# Patient Record
Sex: MALE | Race: WHITE | NOT HISPANIC OR LATINO | Employment: FULL TIME
[De-identification: names, ages, dates, MRNs, and addresses within clinical notes are randomized per-mention and may not be internally consistent; named-entity substitution may affect disease eponyms.]

---
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
## 2024-08-09 ENCOUNTER — APPOINTMENT (OUTPATIENT)
Dept: CT IMAGING | Facility: HOSPITAL | Age: 19
DRG: 201 | End: 2024-08-09
Attending: EMERGENCY MEDICINE
Payer: COMMERCIAL

## 2024-08-09 ENCOUNTER — APPOINTMENT (OUTPATIENT)
Dept: RADIOLOGY | Facility: HOSPITAL | Age: 19
DRG: 201 | End: 2024-08-09
Attending: EMERGENCY MEDICINE
Payer: COMMERCIAL

## 2024-08-09 ENCOUNTER — HOSPITAL ENCOUNTER (INPATIENT)
Facility: HOSPITAL | Age: 19
LOS: 1 days | Discharge: HOME | DRG: 201 | End: 2024-08-10
Attending: EMERGENCY MEDICINE | Admitting: INTERNAL MEDICINE
Payer: COMMERCIAL

## 2024-08-09 DIAGNOSIS — J93.83 SPONTANEOUS PNEUMOTHORAX: ICD-10-CM

## 2024-08-09 PROBLEM — Z83.79 FAMILY HISTORY OF CELIAC DISEASE: Status: ACTIVE | Noted: 2018-03-27

## 2024-08-09 PROBLEM — T14.8XXA CONTUSION OF BONE: Status: ACTIVE | Noted: 2018-09-24

## 2024-08-09 PROBLEM — R22.9 SUBCUTANEOUS NODULES, GENERALIZED: Status: ACTIVE | Noted: 2018-03-27

## 2024-08-09 LAB
ALBUMIN SERPL BCG-MCNC: 4.7 G/DL (ref 3.5–5.2)
ALP SERPL-CCNC: 82 U/L (ref 40–150)
ALT SERPL W P-5'-P-CCNC: 14 U/L (ref 0–70)
ANION GAP SERPL CALCULATED.3IONS-SCNC: 12 MMOL/L (ref 7–15)
AST SERPL W P-5'-P-CCNC: 16 U/L (ref 0–45)
BASOPHILS # BLD AUTO: 0.1 10E3/UL (ref 0–0.2)
BASOPHILS NFR BLD AUTO: 1 %
BILIRUB DIRECT SERPL-MCNC: <0.2 MG/DL (ref 0–0.3)
BILIRUB SERPL-MCNC: 0.5 MG/DL
BUN SERPL-MCNC: 12.7 MG/DL (ref 6–20)
CALCIUM SERPL-MCNC: 9.2 MG/DL (ref 8.8–10.4)
CHLORIDE SERPL-SCNC: 106 MMOL/L (ref 98–107)
CREAT SERPL-MCNC: 1.18 MG/DL (ref 0.67–1.17)
EGFRCR SERPLBLD CKD-EPI 2021: 90 ML/MIN/1.73M2
EOSINOPHIL # BLD AUTO: 0.4 10E3/UL (ref 0–0.7)
EOSINOPHIL NFR BLD AUTO: 6 %
ERYTHROCYTE [DISTWIDTH] IN BLOOD BY AUTOMATED COUNT: 12.5 % (ref 10–15)
GLUCOSE SERPL-MCNC: 88 MG/DL (ref 70–99)
HCO3 SERPL-SCNC: 24 MMOL/L (ref 22–29)
HCT VFR BLD AUTO: 42 % (ref 40–53)
HGB BLD-MCNC: 14.7 G/DL (ref 13.3–17.7)
IMM GRANULOCYTES # BLD: 0 10E3/UL
IMM GRANULOCYTES NFR BLD: 0 %
LYMPHOCYTES # BLD AUTO: 2.1 10E3/UL (ref 0.8–5.3)
LYMPHOCYTES NFR BLD AUTO: 29 %
MAGNESIUM SERPL-MCNC: 2 MG/DL (ref 1.7–2.3)
MCH RBC QN AUTO: 30.3 PG (ref 26.5–33)
MCHC RBC AUTO-ENTMCNC: 35 G/DL (ref 31.5–36.5)
MCV RBC AUTO: 87 FL (ref 78–100)
MONOCYTES # BLD AUTO: 0.6 10E3/UL (ref 0–1.3)
MONOCYTES NFR BLD AUTO: 9 %
NEUTROPHILS # BLD AUTO: 3.9 10E3/UL (ref 1.6–8.3)
NEUTROPHILS NFR BLD AUTO: 56 %
NRBC # BLD AUTO: 0 10E3/UL
NRBC BLD AUTO-RTO: 0 /100
PLATELET # BLD AUTO: 209 10E3/UL (ref 150–450)
POTASSIUM SERPL-SCNC: 4 MMOL/L (ref 3.4–5.3)
PROT SERPL-MCNC: 6.9 G/DL (ref 6.4–8.3)
RBC # BLD AUTO: 4.85 10E6/UL (ref 4.4–5.9)
SODIUM SERPL-SCNC: 142 MMOL/L (ref 135–145)
TSH SERPL DL<=0.005 MIU/L-ACNC: 2.35 UIU/ML (ref 0.3–4.2)
WBC # BLD AUTO: 7 10E3/UL (ref 4–11)

## 2024-08-09 PROCEDURE — 84443 ASSAY THYROID STIM HORMONE: CPT | Performed by: EMERGENCY MEDICINE

## 2024-08-09 PROCEDURE — 99222 1ST HOSP IP/OBS MODERATE 55: CPT | Performed by: INTERNAL MEDICINE

## 2024-08-09 PROCEDURE — 85025 COMPLETE CBC W/AUTO DIFF WBC: CPT | Performed by: EMERGENCY MEDICINE

## 2024-08-09 PROCEDURE — 36415 COLL VENOUS BLD VENIPUNCTURE: CPT | Performed by: EMERGENCY MEDICINE

## 2024-08-09 PROCEDURE — 80053 COMPREHEN METABOLIC PANEL: CPT | Performed by: EMERGENCY MEDICINE

## 2024-08-09 PROCEDURE — 99291 CRITICAL CARE FIRST HOUR: CPT | Mod: 25

## 2024-08-09 PROCEDURE — 71045 X-RAY EXAM CHEST 1 VIEW: CPT

## 2024-08-09 PROCEDURE — 83735 ASSAY OF MAGNESIUM: CPT | Performed by: EMERGENCY MEDICINE

## 2024-08-09 PROCEDURE — 71250 CT THORAX DX C-: CPT

## 2024-08-09 PROCEDURE — 120N000001 HC R&B MED SURG/OB

## 2024-08-09 PROCEDURE — 93005 ELECTROCARDIOGRAM TRACING: CPT | Performed by: EMERGENCY MEDICINE

## 2024-08-09 RX ORDER — OXYCODONE HYDROCHLORIDE 5 MG/1
5 TABLET ORAL EVERY 4 HOURS PRN
Status: DISCONTINUED | OUTPATIENT
Start: 2024-08-09 | End: 2024-08-10 | Stop reason: HOSPADM

## 2024-08-09 RX ORDER — AMOXICILLIN 250 MG
1 CAPSULE ORAL 2 TIMES DAILY PRN
Status: DISCONTINUED | OUTPATIENT
Start: 2024-08-09 | End: 2024-08-10 | Stop reason: HOSPADM

## 2024-08-09 RX ORDER — ACETAMINOPHEN 325 MG/1
650 TABLET ORAL EVERY 4 HOURS PRN
Status: DISCONTINUED | OUTPATIENT
Start: 2024-08-09 | End: 2024-08-10 | Stop reason: HOSPADM

## 2024-08-09 RX ORDER — ACETAMINOPHEN 650 MG/1
650 SUPPOSITORY RECTAL EVERY 4 HOURS PRN
Status: DISCONTINUED | OUTPATIENT
Start: 2024-08-09 | End: 2024-08-10 | Stop reason: HOSPADM

## 2024-08-09 RX ORDER — LIDOCAINE 40 MG/G
CREAM TOPICAL
Status: DISCONTINUED | OUTPATIENT
Start: 2024-08-09 | End: 2024-08-10 | Stop reason: HOSPADM

## 2024-08-09 RX ORDER — CALCIUM CARBONATE 500 MG/1
1000 TABLET, CHEWABLE ORAL 4 TIMES DAILY PRN
Status: DISCONTINUED | OUTPATIENT
Start: 2024-08-09 | End: 2024-08-10 | Stop reason: HOSPADM

## 2024-08-09 RX ORDER — AMOXICILLIN 250 MG
2 CAPSULE ORAL 2 TIMES DAILY PRN
Status: DISCONTINUED | OUTPATIENT
Start: 2024-08-09 | End: 2024-08-10 | Stop reason: HOSPADM

## 2024-08-09 ASSESSMENT — ACTIVITIES OF DAILY LIVING (ADL)
ADLS_ACUITY_SCORE: 35

## 2024-08-09 ASSESSMENT — COLUMBIA-SUICIDE SEVERITY RATING SCALE - C-SSRS
1. IN THE PAST MONTH, HAVE YOU WISHED YOU WERE DEAD OR WISHED YOU COULD GO TO SLEEP AND NOT WAKE UP?: NO
2. HAVE YOU ACTUALLY HAD ANY THOUGHTS OF KILLING YOURSELF IN THE PAST MONTH?: NO
6. HAVE YOU EVER DONE ANYTHING, STARTED TO DO ANYTHING, OR PREPARED TO DO ANYTHING TO END YOUR LIFE?: NO

## 2024-08-09 NOTE — LETTER
[address] [address]  [phone]      August 10, 2024    Jason Vaughn  [address]  [place] [address]  [phone] (home)     : 2005      To Whom it may concern:    Jason Vaughn was admitted to the hospital 2024.    I recommend he be off of work for 1 week.    I recommend that he avoid lifting weights above 10 to 20 pounds for the next 4 weeks.    He has been compliant with his treatment and my ecommendations and I will be seeing him in follow-up after hospital discharge.        Sincerely,    Brandon Head MD

## 2024-08-10 ENCOUNTER — APPOINTMENT (OUTPATIENT)
Dept: RADIOLOGY | Facility: HOSPITAL | Age: 19
DRG: 201 | End: 2024-08-10
Attending: INTERNAL MEDICINE
Payer: COMMERCIAL

## 2024-08-10 VITALS
WEIGHT: 140 LBS | DIASTOLIC BLOOD PRESSURE: 70 MMHG | TEMPERATURE: 98 F | RESPIRATION RATE: 18 BRPM | BODY MASS INDEX: 23.32 KG/M2 | HEART RATE: 56 BPM | SYSTOLIC BLOOD PRESSURE: 132 MMHG | HEIGHT: 65 IN | OXYGEN SATURATION: 100 %

## 2024-08-10 DIAGNOSIS — J93.11 PRIMARY SPONTANEOUS PNEUMOTHORAX: Primary | ICD-10-CM

## 2024-08-10 LAB
ANION GAP SERPL CALCULATED.3IONS-SCNC: 11 MMOL/L (ref 7–15)
BUN SERPL-MCNC: 14.6 MG/DL (ref 6–20)
CALCIUM SERPL-MCNC: 8.8 MG/DL (ref 8.8–10.4)
CHLORIDE SERPL-SCNC: 110 MMOL/L (ref 98–107)
CREAT SERPL-MCNC: 1.38 MG/DL (ref 0.67–1.17)
EGFRCR SERPLBLD CKD-EPI 2021: 75 ML/MIN/1.73M2
GLUCOSE SERPL-MCNC: 88 MG/DL (ref 70–99)
HCO3 SERPL-SCNC: 25 MMOL/L (ref 22–29)
POTASSIUM SERPL-SCNC: 4.4 MMOL/L (ref 3.4–5.3)
SODIUM SERPL-SCNC: 146 MMOL/L (ref 135–145)

## 2024-08-10 PROCEDURE — 36415 COLL VENOUS BLD VENIPUNCTURE: CPT | Performed by: INTERNAL MEDICINE

## 2024-08-10 PROCEDURE — 99238 HOSP IP/OBS DSCHRG MGMT 30/<: CPT | Performed by: INTERNAL MEDICINE

## 2024-08-10 PROCEDURE — 80048 BASIC METABOLIC PNL TOTAL CA: CPT | Performed by: INTERNAL MEDICINE

## 2024-08-10 PROCEDURE — 71045 X-RAY EXAM CHEST 1 VIEW: CPT

## 2024-08-10 PROCEDURE — 99223 1ST HOSP IP/OBS HIGH 75: CPT | Performed by: INTERNAL MEDICINE

## 2024-08-10 RX ORDER — NALOXONE HYDROCHLORIDE 0.4 MG/ML
0.4 INJECTION, SOLUTION INTRAMUSCULAR; INTRAVENOUS; SUBCUTANEOUS
Status: DISCONTINUED | OUTPATIENT
Start: 2024-08-10 | End: 2024-08-10 | Stop reason: HOSPADM

## 2024-08-10 RX ORDER — NALOXONE HYDROCHLORIDE 0.4 MG/ML
0.2 INJECTION, SOLUTION INTRAMUSCULAR; INTRAVENOUS; SUBCUTANEOUS
Status: DISCONTINUED | OUTPATIENT
Start: 2024-08-10 | End: 2024-08-10 | Stop reason: HOSPADM

## 2024-08-10 RX ORDER — ACETAMINOPHEN 325 MG/1
650 TABLET ORAL EVERY 4 HOURS PRN
COMMUNITY
Start: 2024-08-10

## 2024-08-10 ASSESSMENT — ACTIVITIES OF DAILY LIVING (ADL)
CONCENTRATING,_REMEMBERING_OR_MAKING_DECISIONS_DIFFICULTY: NO
DIFFICULTY_COMMUNICATING: NO
DIFFICULTY_EATING/SWALLOWING: NO
ADLS_ACUITY_SCORE: 18
DOING_ERRANDS_INDEPENDENTLY_DIFFICULTY: NO
ADLS_ACUITY_SCORE: 18
TOILETING_ISSUES: NO
ADLS_ACUITY_SCORE: 18
ADLS_ACUITY_SCORE: 18
HEARING_DIFFICULTY_OR_DEAF: NO
FALL_HISTORY_WITHIN_LAST_SIX_MONTHS: NO
WEAR_GLASSES_OR_BLIND: NO
ADLS_ACUITY_SCORE: 18
WALKING_OR_CLIMBING_STAIRS_DIFFICULTY: NO
CHANGE_IN_FUNCTIONAL_STATUS_SINCE_ONSET_OF_CURRENT_ILLNESS/INJURY: NO
DRESSING/BATHING_DIFFICULTY: NO

## 2024-08-10 NOTE — ED NOTES
Expected Patient Referral to ED  7:18 PM    Referring Clinic/Provider:  Urgent care    Reason for referral/Clinical facts:  - came for left shoulder pain x1 day, found to have left pneumothorax on X-rays (no tension per radiology read)  - reports having history of tension patient in the past  - refused EMS and driving himself    Recommendations provided:  Send to ED for further evaluation    Caller was informed that this institution does possess the capabilities and/or resources to provide for patient and should be transferred to our facility.    Discussed that if direct admit is sought and any hurdles are encountered, this ED would be happy to see the patient and evaluate.    Informed caller that recommendations provided are recommendations based only on the facts provided and that they responsible to accept or reject the advice, or to seek a formal in person consultation as needed and that this ED will see/treat patient should they arrive.      Hasmukh Gonzalez MD  [facility] EMERGENCY DEPARTMENT  [address] [place] [id number]  [phone]     Hasmukh Gonzalez MD  08/09/24 1262

## 2024-08-10 NOTE — CARE PLAN
Received Pt from ED at 10:55 alert and oriented, denies pain and  SOB. Pt VSS on 4L O2 sating 99%.  Pt was  settle in bed and report given to the incoming nurse.

## 2024-08-10 NOTE — ED TRIAGE NOTES
Pt arrives to triage due to a referral from their primary clinic. Pt was found to have a pneumothorax. He initially was seen due to chest and back pain. Pt reports when he takes a deep breath the pain is rated 9/10.

## 2024-08-10 NOTE — CONSULTS
LUNG NODULE & INTERVENTIONAL PULMONARY CLINIC  [facility], [facility]     Jason Vaughn MRN# [id number]   Age: 19 year old YOB: 2005       Requesting Physician: No referring provider defined for this encounter.       Assessment and Plan:    1.  Primary spontaneous pneumothorax.  Recurrent.  In the setting of weightlifting, full tidal volume inhalation and resulting coughing from marijuana.  This is improved with conservative management.  I am hopeful that with modifying his smoking behavior he can return to his normal activities.    Chest x-ray next week-I will order  He has my contact information in clinic if he has further pneumothorax type symptoms.  It looks like he can go home today.    It sounds like every episode has been in the left side.  Given the very small nature and its improvement with conservative management I do not recommend surgical intervention at this time.  I will be following up in clinic.    Lifestyle recommendations  1 week off of work  Limit work-related lifting to 10 to 20 pounds for a month  Avoid fitness/weight lifting for 1 month  No smoking  No flying, scuba diving for 1 month      2.  Lung nodules.  Very small.  Incidental.  I do not recommend further follow-up.    Recommendations given directly to hospital medicine MD.    Work note signed and placed in chart.           History:     Jason Vaughn is a 19 year old male with sig h/o for power lifting, smoking who is here for evaluation/followup of spontaneous pneumothorax.  Sounds like he has had this happen before.  Starts with left shoulder blade pain that spreads to a numbness of the left arm as well as anterior left chest.  No obvious sudden causative factor.  He does low rep high weight lifting.  He smokes marijuana occasionally with large tidal inhalations and significant coughing afterwards.  He is a .      - My interpretation of the images  relevant for this visit includes: Small pneumothorax without obvious blebs.             Past Medical History:    History reviewed. No pertinent past medical history.        Past Surgical History:    History reviewed. No pertinent surgical history.       Social History:     Social History     Tobacco Use    Smoking status: Never    Smokeless tobacco: Not on file   Substance Use Topics    Alcohol use: No          Family History:     Family History   Problem Relation Age of Onset    Allergies Mother         hayfever    Family History Negative Father     Lipids Maternal Grandmother         cholesterol    Lipids Maternal Grandfather         cholesterol    Hypertension Maternal Grandfather     Family History Negative Paternal Grandmother     Family History Negative Paternal Grandfather            Allergies:      Allergies   Allergen Reactions    Gluten Meal           Medications:     Current Facility-Administered Medications   Medication Dose Route Frequency Provider Last Rate Last Admin    acetaminophen (TYLENOL) tablet 650 mg  650 mg Oral Q4H PRN Irasema Chatman MD        Or    acetaminophen (TYLENOL) Suppository 650 mg  650 mg Rectal Q4H PRN Irasema Chatman MD        calcium carbonate (TUMS) chewable tablet 1,000 mg  1,000 mg Oral 4x Daily PRN Irasema Chatman MD        lidocaine (LMX4) cream   Topical Q1H PRN Irasema Chatman MD        lidocaine 1 % 0.1-1 mL  0.1-1 mL Other Q1H PRN Irasema Chatman MD        naloxone (NARCAN) injection 0.2 mg  0.2 mg Intravenous Q2 Min PRN Sondra Mott MD        Or    naloxone (NARCAN) injection 0.4 mg  0.4 mg Intravenous Q2 Min PRN Sondra Mott MD        Or    naloxone (NARCAN) injection 0.2 mg  0.2 mg Intramuscular Q2 Min PRN Sondra Mott MD        Or    naloxone (NARCAN) injection 0.4 mg  0.4 mg Intramuscular Q2 Min PRN Sondra Mott MD        oxyCODONE (ROXICODONE) tablet 5 mg  5 mg Oral Q4H PRN Irasema Chatman MD        oxyCODONE IR  "(ROXICODONE) half-tab 2.5 mg  2.5 mg Oral Q4H PRN Irasema Chatman MD        senna-docusate (SENOKOT-S/PERICOLACE) 8.6-50 MG per tablet 1 tablet  1 tablet Oral BID PRN Irasema Chatman MD        Or    senna-docusate (SENOKOT-S/PERICOLACE) 8.6-50 MG per tablet 2 tablet  2 tablet Oral BID PRN Irasema Chatman MD        sodium chloride (PF) 0.9% PF flush 3 mL  3 mL Intracatheter Q8H Irasema Chatman MD   3 mL at 08/10/24 0616    sodium chloride (PF) 0.9% PF flush 3 mL  3 mL Intracatheter q1 min prn Irasema Chatman MD              Review of Systems:     See HPI         Physical Exam:   /70   Pulse 56   Temp 98  F (36.7  C) (Oral)   Resp 18   Ht 1.651 m (5' 5\")   Wt 63.5 kg (140 lb)   SpO2 100%   BMI 23.30 kg/m      Constitutional - looks well, in no apparent distress  Eyes - no redness or discharge  Respiratory -breathing appears comfortable. No wheeze or rhonchi.   Cardiac -- Normal rate, rhythm.   Skin - No appreciable discoloration or lesions (very limited exam)  Neurological - No apparent tremors. Speech fluent and articlate  Psychiatric - no signs of delirium or anxiety          Current Laboratory Data:   All laboratory and imaging data reviewed.    Results for orders placed or performed during the hospital encounter of 08/09/24 (from the past 24 hour(s))   XR Chest Port 1 View    Narrative    EXAM: XR CHEST PORT 1 VIEW  LOCATION: [facility]  DATE: 8/9/2024    INDICATION: chest pain, follow-up pneumothorax  COMPARISON: 8/9/2024 at 1806 hours      Impression    IMPRESSION: No change. Small left pneumothorax measures approximately 1.8 cm at the apex. Lungs remain clear. No shift of midline. Heart size normal.   CBC with platelets differential    Narrative    The following orders were created for panel order CBC with platelets differential.  Procedure                               Abnormality         Status                     ---------                              "  -----------         ------                     CBC with platelets and d...[[id number]]                      Final result                 Please view results for these tests on the individual orders.   Basic metabolic panel   Result Value Ref Range    Sodium 142 135 - 145 mmol/L    Potassium 4.0 3.4 - 5.3 mmol/L    Chloride 106 98 - 107 mmol/L    Carbon Dioxide (CO2) 24 22 - 29 mmol/L    Anion Gap 12 7 - 15 mmol/L    Urea Nitrogen 12.7 6.0 - 20.0 mg/dL    Creatinine 1.18 (H) 0.67 - 1.17 mg/dL    GFR Estimate 90 >60 mL/min/1.73m2    Calcium 9.2 8.8 - 10.4 mg/dL    Glucose 88 70 - 99 mg/dL   Hepatic function panel   Result Value Ref Range    Protein Total 6.9 6.4 - 8.3 g/dL    Albumin 4.7 3.5 - 5.2 g/dL    Bilirubin Total 0.5 <=1.2 mg/dL    Alkaline Phosphatase 82 40 - 150 U/L    AST 16 0 - 45 U/L    ALT 14 0 - 70 U/L    Bilirubin Direct <0.20 0.00 - 0.30 mg/dL   Magnesium   Result Value Ref Range    Magnesium 2.0 1.7 - 2.3 mg/dL   TSH with free T4 reflex   Result Value Ref Range    TSH 2.35 0.30 - 4.20 uIU/mL   CBC with platelets and differential   Result Value Ref Range    WBC Count 7.0 4.0 - 11.0 10e3/uL    RBC Count 4.85 4.40 - 5.90 10e6/uL    Hemoglobin 14.7 13.3 - 17.7 g/dL    Hematocrit 42.0 40.0 - 53.0 %    MCV 87 78 - 100 fL    MCH 30.3 26.5 - 33.0 pg    MCHC 35.0 31.5 - 36.5 g/dL    RDW 12.5 10.0 - 15.0 %    Platelet Count 209 150 - 450 10e3/uL    % Neutrophils 56 %    % Lymphocytes 29 %    % Monocytes 9 %    % Eosinophils 6 %    % Basophils 1 %    % Immature Granulocytes 0 %    NRBCs per 100 WBC 0 <1 /100    Absolute Neutrophils 3.9 1.6 - 8.3 10e3/uL    Absolute Lymphocytes 2.1 0.8 - 5.3 10e3/uL    Absolute Monocytes 0.6 0.0 - 1.3 10e3/uL    Absolute Eosinophils 0.4 0.0 - 0.7 10e3/uL    Absolute Basophils 0.1 0.0 - 0.2 10e3/uL    Absolute Immature Granulocytes 0.0 <=0.4 10e3/uL    Absolute NRBCs 0.0 10e3/uL   Chest CT w/o contrast    Narrative    EXAM: CT CHEST W/O CONTRAST  LOCATION: [facility]  DATE: 8/9/2024    INDICATION: spontaneous pneumothorax, ?underlying lung disease  COMPARISON: Chest x-ray 9 2024  TECHNIQUE: CT chest without IV contrast. Multiplanar reformats were obtained. Dose reduction techniques were used.  CONTRAST: None.    FINDINGS:   LUNGS AND PLEURA: Small left pneumothorax. Lungs are clear. No pleural effusion. 3 mm right upper lobe nodule S4 image 54. Several sub-5 mm fissural nodules posteriorly series 4 image 125 - 128 right major fissure.    MEDIASTINUM/AXILLAE: No adenopathy or significant pericardial effusion.    CORONARY ARTERY CALCIFICATION: No significant visualized.    UPPER ABDOMEN: Grossly within normal limits where seen.    MUSCULOSKELETAL: Unremarkable.      Impression    IMPRESSION:   1.  Small left pneumothorax.  2.  Sub-5 mm right nodules.    REFERENCE:  Guidelines for Management of Incidental Pulmonary Nodules Detected on CT Images: From the Fleischner Society 2017.   Guidelines apply to incidental nodules in patients who are 35 years or older.  Guidelines do not apply to lung cancer screening, patients with immunosuppression, or patients with known primary cancer.    REFERENCE:  Guidelines for Management of Incidental Pulmonary Nodules Detected on CT Images: From the Fleischner Society 2017.   Guidelines apply to incidental nodules in patients who are 35 years or older.  Guidelines do not apply to lung cancer screening, patients with immunosuppression, or patients with known primary cancer.    MULTIPLE NODULES  Nodule size <6 mm  Low-risk patients: No follow-up needed.  High-risk patients: Optional follow-up at 12 months.       Basic metabolic panel   Result Value Ref Range    Sodium 146 (H) 135 - 145 mmol/L    Potassium 4.4 3.4 - 5.3 mmol/L    Chloride 110 (H) 98 - 107 mmol/L    Carbon Dioxide (CO2) 25 22 - 29 mmol/L    Anion Gap 11 7 - 15 mmol/L    Urea Nitrogen 14.6 6.0 - 20.0 mg/dL    Creatinine 1.38 (H) 0.67 - 1.17 mg/dL    GFR Estimate 75 >60  mL/min/1.73m2    Calcium 8.8 8.8 - 10.4 mg/dL    Glucose 88 70 - 99 mg/dL   XR Chest Port 1 View    Narrative    EXAM: XR CHEST PORT 1 VIEW  LOCATION: [facility]  DATE: 8/10/2024    INDICATION: pneumothorax  COMPARISON: CT x-rays from yesterday evening.      Impression    IMPRESSION: Stable pneumothorax is smaller this morning, measuring about 12 mm at the apex compared with 18 to 19 mm yesterday. No lateral or basilar component is evident. Otherwise negative chest x-ray.

## 2024-08-10 NOTE — H&P
[facility]    History and Physical - Hospitalist Service       Date of Admission:  8/9/2024    Assessment & Plan      Jason Vaughn is a 19 year old male without significant past medical history who was admitted on 8/9/2024 due to pleuritic chest pain and being found to have a left sided spontaneous pneumothorax.    Spontaneous pneumothorax  -Pleuritic chest pain: Initial episode 1 month ago, second episode 2 weeks ago, third episode today  -Chest x-ray 8/9: Small left pneumothorax measuring 1.8 cm at the apex, which is unchanged from the x-ray done 2 hours prior urgent care  -CT chest 8/9: Small left pneumothorax, 3 mm right upper lobe nodule, several sub-5 mm fissural nodules  -Repeat chest x-ray for 6 AM ordered  -Pulmonology consulted  -No chest tube placed at this time  -Pain is controlled          Diet: Regular Diet Adult  DVT Prophylaxis: Pneumatic Compression Devices  Le Catheter: Not present  Lines: None     Cardiac Monitoring: None  Code Status: Full Code    Clinically Significant Risk Factors Present on Admission                                           Disposition Plan     Medically Ready for Discharge: Anticipated in 2-4 Days           Irasema Chatman MD  Hospitalist Service  [facility]  Securely message with [employer]! (more info)  Text page via [employer] Paging/Directory     ______________________________________________________________________    Chief Complaint   Pleuritic chest pain    History is obtained from the patient, electronic health record, emergency department physician, and patient's parents    History of Present Illness   Jason Vaughn is a 19 year old male without significant past medical history who was admitted on 8/9/2024 due to pleuritic chest pain and being found to have a left sided spontaneous pneumothorax.  About a month ago he experienced pleuritic chest pain for the first time.  He was at work and not doing anything  strenuous when he suddenly felt pain in the left side of his chest that was worse with breathing.  He states that the pain radiated into his left arm and into the 2 of his fingers.  The pain lasted for several hours approximately 4-6 and then gradually decreased over the next few days.  He had a second event about 2 weeks ago where the severe pain lasted approximately 4 hours and then the pain resolved over a few days.  He began to have this pain again today and went to urgent care where he had a chest x-ray done showing a 1.8 cm pneumothorax.  When he came here to the ED he had another chest x-ray done approximately 2 hours later which showed no change in size of the pneumothorax.  He admits to MJ use.  He is a power .  He denies any other symptoms.  He does not take any medications.       Past Medical History    History reviewed. No pertinent past medical history.    Past Surgical History   History reviewed. No pertinent surgical history.    Prior to Admission Medications   None        Review of Systems    The 10 point Review of Systems is negative other than noted in the HPI.    Social History   I have reviewed this patient's social history and updated it with pertinent information if needed.  Social History     Tobacco Use    Smoking status: Never   Substance Use Topics    Alcohol use: No    Drug use: No         Family History   I have reviewed this patient's family history and updated it with pertinent information if needed.  Family History   Problem Relation Age of Onset    Allergies Mother         hayfever    Family History Negative Father     Lipids Maternal Grandmother         cholesterol    Lipids Maternal Grandfather         cholesterol    Hypertension Maternal Grandfather     Family History Negative Paternal Grandmother     Family History Negative Paternal Grandfather          Allergies   Allergies   Allergen Reactions    Gluten Meal         Physical Exam   Vital Signs: Temp: 99  F (37.2  C) Temp  src: Temporal BP: 120/72 Pulse: 81   Resp: 16 SpO2: 100 % O2 Device: Nasal cannula Oxygen Delivery: 4 LPM  Weight: 140 lbs 0 oz    Constitutional: awake, alert, cooperative, no apparent distress, and appears stated age  Eyes: Lids and lashes normal, pupils equal, round, extra ocular muscles intact, sclera clear, conjunctiva normal  Respiratory: No increased work of breathing, good air exchange, clear to auscultation bilaterally, no crackles or wheezing  Cardiovascular: regular rate and rhythm, and no murmur noted  GI: normal bowel sounds, soft, non-distended, non-tender  Skin: normal skin color, texture, turgor, no rashes and no jaundice  Musculoskeletal: no lower extremity pitting edema present, tone is normal, no weakness noted  Neurologic: Awake, alert, no gross focal abnormalities   Neuropsychiatric: Appropriate mood and affect      Medical Decision Making       45 MINUTES SPENT BY ME on the date of service doing chart review, history, exam, documentation & further activities per the note.      Data     I have personally reviewed the following data over the past 24 hrs:    7.0  \   14.7   / 209     142 106 12.7 /  88   4.0 24 1.18 (H) \     ALT: 14 AST: 16 AP: 82 TBILI: 0.5   ALB: 4.7 TOT PROTEIN: 6.9 LIPASE: N/A     TSH: 2.35 T4: N/A A1C: N/A       Imaging results reviewed over the past 24 hrs:   Recent Results (from the past 24 hour(s))   XR Chest B Read 2 views    Narrative    For Patients: As a result of the 21st Century Cures Act, medical imaging exams and procedure reports are released immediately into your electronic medical record. You may view this report before your referring provider. If you have questions, please contact your health care provider.    EXAM: XR CHEST 2 VIEWS PA AND LATERAL  LOCATION: [facility]  DATE: 8/9/2024    INDICATION: Chest Pain, Pleuritic  COMPARISON: 6/14/2010    Impression    Left apical pneumothorax measuring 1.8 cm. No evidence for tension pneumothorax.    No focal  airspace disease. No pleural effusion. No right pneumothorax.    The cardiomediastinal silhouette is unremarkable.    Critical Result: Pneumothorax    Finding was identified on 8/9/2024 6:23 PM CDT.    Attempts to page the on-call physician began on 8/9/2024 at 6:24 PM. [employer] returned the page on 8/9/2024 at 6:41 PM CDT. I discussed the critical result with her, and she verbalized understanding of the critical result.    XR Chest Port 1 View    Narrative    EXAM: XR CHEST PORT 1 VIEW  LOCATION: [facility]  DATE: 8/9/2024    INDICATION: chest pain, follow-up pneumothorax  COMPARISON: 8/9/2024 at 1806 hours      Impression    IMPRESSION: No change. Small left pneumothorax measures approximately 1.8 cm at the apex. Lungs remain clear. No shift of midline. Heart size normal.   Chest CT w/o contrast    Narrative    EXAM: CT CHEST W/O CONTRAST  LOCATION: [facility]  DATE: 8/9/2024    INDICATION: spontaneous pneumothorax, ?underlying lung disease  COMPARISON: Chest x-ray 9 2024  TECHNIQUE: CT chest without IV contrast. Multiplanar reformats were obtained. Dose reduction techniques were used.  CONTRAST: None.    FINDINGS:   LUNGS AND PLEURA: Small left pneumothorax. Lungs are clear. No pleural effusion. 3 mm right upper lobe nodule S4 image 54. Several sub-5 mm fissural nodules posteriorly series 4 image 125 - 128 right major fissure.    MEDIASTINUM/AXILLAE: No adenopathy or significant pericardial effusion.    CORONARY ARTERY CALCIFICATION: No significant visualized.    UPPER ABDOMEN: Grossly within normal limits where seen.    MUSCULOSKELETAL: Unremarkable.      Impression    IMPRESSION:   1.  Small left pneumothorax.  2.  Sub-5 mm right nodules.    REFERENCE:  Guidelines for Management of Incidental Pulmonary Nodules Detected on CT Images: From the Fleischner Society 2017.   Guidelines apply to incidental nodules in patients who are 35 years or older.  Guidelines  do not apply to lung cancer screening, patients with immunosuppression, or patients with known primary cancer.    REFERENCE:  Guidelines for Management of Incidental Pulmonary Nodules Detected on CT Images: From the Fleischner Society 2017.   Guidelines apply to incidental nodules in patients who are 35 years or older.  Guidelines do not apply to lung cancer screening, patients with immunosuppression, or patients with known primary cancer.    MULTIPLE NODULES  Nodule size <6 mm  Low-risk patients: No follow-up needed.  High-risk patients: Optional follow-up at 12 months.

## 2024-08-10 NOTE — ED PROVIDER NOTES
EMERGENCY DEPARTMENT ENCOUNTER      NAME: Jason Vaughn  AGE: 19 year old male  YOB: 2005  MRN: [id number]  EVALUATION DATE & TIME: 8/9/2024  7:45 PM    PCP: Ramonita Layne    ED PROVIDER: Hasmukh Gonzalez M.D.      Chief Complaint   Patient presents with    Chest Pain         IMPRESSION  1. Spontaneous pneumothorax        PLAN  - admit to hospitalist for further care with pulmonology consulting; med/surg tele admit    ED COURSE & MEDICAL DECISION MAKING    ED Course as of 08/09/24 2319   Fri Aug 09, 2024   2024 CXR independently reviewed & interpreted by me: left pneumothorax. No lobar infiltrate, no pulmonary edema.     19yoM with history of occasional marijuana use, powerlifting presenting from Urgent Care with spontaneous left pneumothorax; small. Reports he was sitting & scrolling on his phone this afternoon when he developed sharp pleuritic left chest pain. Went to Urgent Care and CXR found small left pneumothorax; thus sent to the ED. States he had similar pain 2 previous times over the past month which resolved spontaneously after a day or so; wonders if he had a pneumothorax at that time to. Denies any fall or injury. Did not power lift earlier today.    Normal vitals on presentation. Calm on exam with mild decreased breath sounds on left, mild splinting when breathing but no respiratory distress, no stridor, normal phonation, no peripheral edema, benign abdomen, clear mentation.    Placed on 4L NC as repeat CXR obtained; unchanged from earlier today with small pneumothorax.    I discussed options with patient at bedside and recommended pigtail chest tube placement; he prefers to defer this now and stay on NC O2 with repeat CXR to see if it is improving on his own; thinks he resolved spontaneous pneumothorax in the past as above. Declines chest tube at this time. Discussed with pulmonary who recommends CT for further evaluation (?underlying lung disease) and admission on telemetry.  Consulted hospitalist for admission; they agreed. Patient understood and agreed with the plan; no further questions at the time of admission.      --------------------------------------------------------------------------------   --------------------------------------------------------------------------------     7:50 PM I met with the patient for the initial interview and physical examination. Discussed plan for treatment and workup in the ED.  10:05 PM Spoke with Dr. Chatman, hospitalist.        This patient involved a high degree of complexity in medical decision making, as significant risks were present and assessed. Recent encounters & results in medical record reviewed by me.    All workup (i.e. any EKG/labs/imaging as per charting below) reviewed and independently interpreted by me. See respective sections for details.        See additional MDM below if interested.      MEDICATIONS GIVEN IN THE EMERGENCY DEPARTMENT  Medications   lidocaine 1 % 0.1-1 mL (has no administration in time range)   lidocaine (LMX4) cream (has no administration in time range)   sodium chloride (PF) 0.9% PF flush 3 mL (has no administration in time range)   sodium chloride (PF) 0.9% PF flush 3 mL (has no administration in time range)   senna-docusate (SENOKOT-S/PERICOLACE) 8.6-50 MG per tablet 1 tablet (has no administration in time range)     Or   senna-docusate (SENOKOT-S/PERICOLACE) 8.6-50 MG per tablet 2 tablet (has no administration in time range)   calcium carbonate (TUMS) chewable tablet 1,000 mg (has no administration in time range)   acetaminophen (TYLENOL) tablet 650 mg (has no administration in time range)     Or   acetaminophen (TYLENOL) Suppository 650 mg (has no administration in time range)   oxyCODONE IR (ROXICODONE) half-tab 2.5 mg (has no administration in time range)   oxyCODONE (ROXICODONE) tablet 5 mg (has no administration in time range)                =================================================================      HPI  Use of : N/A       Jason Vaughn is a 19 year old male with no pertinent history who presents to this ED via walk-in for evaluation of chest pain.    Patient reports sudden onset of left shoulder and chest pain around 4:30 pm. He went to urgent care and they did a chest xray that revealed a left pneumothorax. He was advised to come to the ED for further work-up. Patient endorses 2 similar episodes. The first time occurred a month ago, where he felt a sudden pain between his shoulder blades while he was sitting, scrolling on his phone. Then he developed numbness in his left arm and pain wrapping around his chest. He also was lightheaded like he was going to faint. He was evaluated at the time and they prescribed him a muscle relaxer. No imaging was done. He had another episode 2-3 weeks ago but it wasn't as severe as the first time so he never got seen. His current symptoms feel similar to his previous episodes. Patient endorses that deep breaths worsen his pain. Patient's father notes that the he works out often, however patient has not lifted weights or anything heavy in the last couple of days. He denies any other concerns.     He smokes marijuana but no tobacco use. He denies lightheadedness.    Per chart review, patient was seen at [facility] urgent care in [place] for chest wall pain on 8/9/24. Patient reported left shoulder, left arm pain, and chest muscle pain that started today. Chest Xray showed a small apical pneumothorax on the left and at this point it appears not to be a tension pneumothorax. Patient was referred to the ED.       --------------- MEDICAL HISTORY ---------------  PAST MEDICAL HISTORY:  Reviewed independently by me.  History reviewed. No pertinent past medical history.  Patient Active Problem List   Diagnosis    Contusion of bone    Family history of celiac disease    Subcutaneous nodules,  generalized    Spontaneous pneumothorax       PAST SURGICAL HISTORY:  Reviewed independently by me.  History reviewed. No pertinent surgical history.    CURRENT MEDICATIONS:    Reviewed independently by me.    Current Facility-Administered Medications:     acetaminophen (TYLENOL) tablet 650 mg, 650 mg, Oral, Q4H PRN **OR** acetaminophen (TYLENOL) Suppository 650 mg, 650 mg, Rectal, Q4H PRN, Irasema Chatman MD    calcium carbonate (TUMS) chewable tablet 1,000 mg, 1,000 mg, Oral, 4x Daily PRN, Irasema Chatman MD    lidocaine (LMX4) cream, , Topical, Q1H PRN, Irasema Chatman MD    lidocaine 1 % 0.1-1 mL, 0.1-1 mL, Other, Q1H PRN, Irasema Chatman MD    oxyCODONE (ROXICODONE) tablet 5 mg, 5 mg, Oral, Q4H PRN, Irasema Chatman MD    oxyCODONE IR (ROXICODONE) half-tab 2.5 mg, 2.5 mg, Oral, Q4H PRN, Irasema Chatman MD    senna-docusate (SENOKOT-S/PERICOLACE) 8.6-50 MG per tablet 1 tablet, 1 tablet, Oral, BID PRN **OR** senna-docusate (SENOKOT-S/PERICOLACE) 8.6-50 MG per tablet 2 tablet, 2 tablet, Oral, BID PRN, Irasema Chatman MD    sodium chloride (PF) 0.9% PF flush 3 mL, 3 mL, Intracatheter, Q8H, Irasema Chatman MD    sodium chloride (PF) 0.9% PF flush 3 mL, 3 mL, Intracatheter, q1 min prn, Irasema Chatman MD    ALLERGIES:  Reviewed independently by me.  Allergies   Allergen Reactions    Gluten Meal        FAMILY HISTORY:  Reviewed independently by me.  Family History   Problem Relation Age of Onset    Allergies Mother         hayfever    Family History Negative Father     Lipids Maternal Grandmother         cholesterol    Lipids Maternal Grandfather         cholesterol    Hypertension Maternal Grandfather     Family History Negative Paternal Grandmother     Family History Negative Paternal Grandfather          SOCIAL HISTORY:   Reviewed independently by me.  Social History     Socioeconomic History    Marital status: Single   Tobacco Use    Smoking status: Never   Substance and Sexual  Activity    Alcohol use: No    Drug use: No    Sexual activity: Never     Social Determinants of Health     Financial Resource Strain: Low Risk  (8/9/2024)    Received from [facility]    Financial Resource Strain     Difficulty of Paying Living Expenses: 3   Food Insecurity: No Food Insecurity (8/9/2024)    Received from [facility]    Food Insecurity     Worried About Running Out of Food in the Last Year: 1   Transportation Needs: No Transportation Needs (8/9/2024)    Received from [facility]    Transportation Needs     Lack of Transportation (Medical): 1   Social Connections: Socially Integrated (8/9/2024)    Received from [facility]    Social Connections     Frequency of Communication with Friends and Family: 0   Housing Stability: Low Risk  (8/9/2024)    Received from [facility]    Housing Stability     Unable to Pay for Housing in the Last Year: 1       --------------- PHYSICAL EXAM ---------------  Nursing notes and vitals independently reviewed by me.  VITALS:  Vitals:    08/09/24 2045 08/09/24 2100 08/09/24 2115 08/09/24 2301   BP: 111/64 122/77 120/72 123/74   BP Location:    Left arm   Pulse: 59 62 81 63   Resp: 16 23 16 16   Temp:    98.2  F (36.8  C)   TempSrc:    Oral   SpO2: 100% 100% 100% 100%   Weight:       Height:           PHYSICAL EXAM:    General:  alert, interactive, no distress  Eyes:  conjunctivae clear, conjugate gaze  HENT:  atraumatic, nose with no rhinorrhea, oropharynx clear  Neck:  no meningismus  Cardiovascular:  HR 80s during exam, regular rhythm, no murmurs, brisk cap refill  Chest:  no chest wall tenderness  Pulmonary:  no stridor, normal phonation. Mild decreased breath sounds in left lung field. Splinting respiration with no respiratory distress  Abdomen:  soft, nondistended, nontender  :  no CVA  tenderness  Back:  no midline spinal tenderness  Musculoskeletal:  no pretibial edema, no calf tenderness. Gross ROM intact to joints of extremities with no obvious deformities.  Skin:  warm, dry, no rash  Neuro:  awake, alert, answers questions appropriately, follows commands, moves all limbs  Psych:  calm, normal affect      --------------- RESULTS ---------------  EKG:    Reviewed and independently interpreted by me.  - NSR at 85bpm, no ST changes, small symmetric TWI in V1, normal intervals  - no priors for comparison  My read.    LAB:  Reviewed and independently interpreted by me.  Results for orders placed or performed during the hospital encounter of 08/09/24   XR Chest Port 1 View    Impression    IMPRESSION: No change. Small left pneumothorax measures approximately 1.8 cm at the apex. Lungs remain clear. No shift of midline. Heart size normal.   Chest CT w/o contrast    Impression    IMPRESSION:   1.  Small left pneumothorax.  2.  Sub-5 mm right nodules.    REFERENCE:  Guidelines for Management of Incidental Pulmonary Nodules Detected on CT Images: From the Fleischner Society 2017.   Guidelines apply to incidental nodules in patients who are 35 years or older.  Guidelines do not apply to lung cancer screening, patients with immunosuppression, or patients with known primary cancer.    REFERENCE:  Guidelines for Management of Incidental Pulmonary Nodules Detected on CT Images: From the Fleischner Society 2017.   Guidelines apply to incidental nodules in patients who are 35 years or older.  Guidelines do not apply to lung cancer screening, patients with immunosuppression, or patients with known primary cancer.    MULTIPLE NODULES  Nodule size <6 mm  Low-risk patients: No follow-up needed.  High-risk patients: Optional follow-up at 12 months.       Basic metabolic panel   Result Value Ref Range    Sodium 142 135 - 145 mmol/L    Potassium 4.0 3.4 - 5.3 mmol/L    Chloride 106 98 - 107 mmol/L    Carbon Dioxide  (CO2) 24 22 - 29 mmol/L    Anion Gap 12 7 - 15 mmol/L    Urea Nitrogen 12.7 6.0 - 20.0 mg/dL    Creatinine 1.18 (H) 0.67 - 1.17 mg/dL    GFR Estimate 90 >60 mL/min/1.73m2    Calcium 9.2 8.8 - 10.4 mg/dL    Glucose 88 70 - 99 mg/dL   Hepatic function panel   Result Value Ref Range    Protein Total 6.9 6.4 - 8.3 g/dL    Albumin 4.7 3.5 - 5.2 g/dL    Bilirubin Total 0.5 <=1.2 mg/dL    Alkaline Phosphatase 82 40 - 150 U/L    AST 16 0 - 45 U/L    ALT 14 0 - 70 U/L    Bilirubin Direct <0.20 0.00 - 0.30 mg/dL   Result Value Ref Range    Magnesium 2.0 1.7 - 2.3 mg/dL   TSH with free T4 reflex   Result Value Ref Range    TSH 2.35 0.30 - 4.20 uIU/mL   CBC with platelets and differential   Result Value Ref Range    WBC Count 7.0 4.0 - 11.0 10e3/uL    RBC Count 4.85 4.40 - 5.90 10e6/uL    Hemoglobin 14.7 13.3 - 17.7 g/dL    Hematocrit 42.0 40.0 - 53.0 %    MCV 87 78 - 100 fL    MCH 30.3 26.5 - 33.0 pg    MCHC 35.0 31.5 - 36.5 g/dL    RDW 12.5 10.0 - 15.0 %    Platelet Count 209 150 - 450 10e3/uL    % Neutrophils 56 %    % Lymphocytes 29 %    % Monocytes 9 %    % Eosinophils 6 %    % Basophils 1 %    % Immature Granulocytes 0 %    NRBCs per 100 WBC 0 <1 /100    Absolute Neutrophils 3.9 1.6 - 8.3 10e3/uL    Absolute Lymphocytes 2.1 0.8 - 5.3 10e3/uL    Absolute Monocytes 0.6 0.0 - 1.3 10e3/uL    Absolute Eosinophils 0.4 0.0 - 0.7 10e3/uL    Absolute Basophils 0.1 0.0 - 0.2 10e3/uL    Absolute Immature Granulocytes 0.0 <=0.4 10e3/uL    Absolute NRBCs 0.0 10e3/uL       RADIOLOGY:  Reviewed and independently interpreted by me. Please see official radiology report.  Recent Results (from the past 24 hour(s))   XR Chest B Read 2 views    Narrative    For Patients: As a result of the 21st Century Cures Act, medical imaging exams and procedure reports are released immediately into your electronic medical record. You may view this report before your referring provider. If you have questions, please contact your health care  provider.    EXAM: XR CHEST 2 VIEWS PA AND LATERAL  LOCATION: [facility]  DATE: 8/9/2024    INDICATION: Chest Pain, Pleuritic  COMPARISON: 6/14/2010    Impression    Left apical pneumothorax measuring 1.8 cm. No evidence for tension pneumothorax.    No focal airspace disease. No pleural effusion. No right pneumothorax.    The cardiomediastinal silhouette is unremarkable.    Critical Result: Pneumothorax    Finding was identified on 8/9/2024 6:23 PM CDT.    Attempts to page the on-call physician began on 8/9/2024 at 6:24 PM. Jazzmine Rock returned the page on 8/9/2024 at 6:41 PM CDT. I discussed the critical result with her, and she verbalized understanding of the critical result.    XR Chest Port 1 View    Narrative    EXAM: XR CHEST PORT 1 VIEW  LOCATION: [facility]  DATE: 8/9/2024    INDICATION: chest pain, follow-up pneumothorax  COMPARISON: 8/9/2024 at 1806 hours      Impression    IMPRESSION: No change. Small left pneumothorax measures approximately 1.8 cm at the apex. Lungs remain clear. No shift of midline. Heart size normal.   Chest CT w/o contrast    Narrative    EXAM: CT CHEST W/O CONTRAST  LOCATION: [facility]  DATE: 8/9/2024    INDICATION: spontaneous pneumothorax, ?underlying lung disease  COMPARISON: Chest x-ray 9 2024  TECHNIQUE: CT chest without IV contrast. Multiplanar reformats were obtained. Dose reduction techniques were used.  CONTRAST: None.    FINDINGS:   LUNGS AND PLEURA: Small left pneumothorax. Lungs are clear. No pleural effusion. 3 mm right upper lobe nodule S4 image 54. Several sub-5 mm fissural nodules posteriorly series 4 image 125 - 128 right major fissure.    MEDIASTINUM/AXILLAE: No adenopathy or significant pericardial effusion.    CORONARY ARTERY CALCIFICATION: No significant visualized.    UPPER ABDOMEN: Grossly within normal limits where seen.    MUSCULOSKELETAL: Unremarkable.      Impression    IMPRESSION:   1.  Small left  pneumothorax.  2.  Sub-5 mm right nodules.    REFERENCE:  Guidelines for Management of Incidental Pulmonary Nodules Detected on CT Images: From the Fleischner Society 2017.   Guidelines apply to incidental nodules in patients who are 35 years or older.  Guidelines do not apply to lung cancer screening, patients with immunosuppression, or patients with known primary cancer.    REFERENCE:  Guidelines for Management of Incidental Pulmonary Nodules Detected on CT Images: From the Fleischner Society 2017.   Guidelines apply to incidental nodules in patients who are 35 years or older.  Guidelines do not apply to lung cancer screening, patients with immunosuppression, or patients with known primary cancer.    MULTIPLE NODULES  Nodule size <6 mm  Low-risk patients: No follow-up needed.  High-risk patients: Optional follow-up at 12 months.             PROCEDURES:   Procedures   --------------------------------------------------------------------------------   Cardiac telemetry monitoring ordered by me secondary to the patient's history of chest pain and to monitor the patient for dysrhythmia. Reviewed & independently interpreted by me. Revealed normal sinus rhythm.  --------------------------------------------------------------------------------           Critical Care     Performed by:   Hasmukh Gonzalez MD   Authorized by:   Hasmukh Gonzalez MD  Total critical care time: 35 minutes (Critical care time was exclusive of separately billable procedures and treating other patients.)    Critical care was necessary to treat or prevent imminent or life-threatening deterioration of the following conditions: pneumothorax requiring supplemental oxygen, telemetry monitoring, admission    Critical care was time spent personally by me on the following activities:  - obtaining history from patient or surrogate  - examination of patient  - development of treatment plan with patient or surrogate  - ordering and performing treatments and  interventions  - ordering and review of laboratory studies  - ordering and review of radiographic studies  - re-evaluation of patient's condition  - monitoring for potential decompensation  - discussion with consultants  ---------------------------------------------------------------------------------------------------------------------  ---------------------------------------------------------------------------------------------------------------------        --------------- ADDITIONAL MDM ---------------  History:  - I considered systemic symptoms of the presenting illness.  - Supplemental history from:       -- patient, parents  - External Record(s) reviewed:       -- Inpatient/outpatient record, prior labs, prior imaging       -- see above ED course & MDM for further details    Workup:  - Chart documentation above includes differential considered and any EKGs or imaging independently interpreted by provider.  - In additional to work up documented, I considered the following work up:       -- see above ED course & MDM for further details    External Consultation:  - Discussion of management with another provider:       -- see above charting for additional    Complicating Factors:  - Care impacted by chronic illness:       -- see above MDM, past medical history, & problem list  - Care affected by social determinants of health:       -- see above social history       -- Access to Affordable Healthcare    Disposition Considerations:  - Admit       I, Marcelina Lofton, am serving as a scribe to document services personally performed by Dr. Hasmukh Gonzalez based on my observation and the provider's statements to me. I, Hasmukh Gonzalez MD attest that Marcelina Srinivasaagus BeckerRolly is acting in a scribe capacity, has observed my performance of the services and has documented them in accordance with my direction.      Hasmukh Gonzalez MD  08/09/24  Emergency Medicine  [facility] EMERGENCY DEPARTMENT  [address]  [facility] [id number]  [phone]  Dept: [phone]       Hasmukh Gonzalez MD  08/09/24 2031

## 2024-08-10 NOTE — DISCHARGE SUMMARY
[facility]  Hospitalist Discharge Summary      Date of Admission:  8/9/2024  Date of Discharge:  8/10/2024  Discharging Provider: Sondra Mott MD  Discharge Service: Hospitalist Service    Discharge Diagnoses   Primary spontaneous pneumothorax  Pleuritic chest pain  Pulmonary nodules  Tobacco use disorder  Marijuana use disorder  Follow-ups Needed After Discharge   Follow-up Appointments    Follow-up and recommended labs and tests      Follow up with primary care provider, Physician No Ref-Primary, within 7   days for hospital follow- up.  The following labs/tests are recommended:   Chest x-ray.    Follow-up with Dr. Head, at pulmonology clinic, next week.      Discharge Disposition   Discharged to home  Condition at discharge: Stable    Hospital Course   Patient is a 19-year-old previously healthy gentleman with PMH of tobacco use disorder, with a hobby of power lifting, presented to ED for evaluation of dyspnea and chest pain.  He was diagnosed with spontaneous small pneumothorax on the left.  Initial chest x-ray showed apical 1.8 cm pneumothorax.  Follow-up CXR showed decreasing PTX to about 12 mm.  Patient was seen by pulmonology, recommended smoking cessation, 1 week off work, no lifting more than 10-20 pounds for months, avoid power lifting for 1 months, no flying or scuba diving for 1 months.  Follow-up appointment with pulmonology next week, to be arranged by their service.    Consultations This Hospital Stay   PULMONARY IP CONSULT    Code Status   Full Code    Time Spent on this Encounter   I, Sondra Mott MD, personally saw the patient today and spent less than or equal to 30 minutes discharging this patient.       Sondra Mott MD  [address] [address]  Phone: [phone]  Fax: [phone]  ______________________________________________________________________    Physical Exam   Vital Signs: Temp: 98  F  (36.7  C) Temp src: Oral BP: 132/70 Pulse: 56   Resp: 18 SpO2: 100 % O2 Device: Nasal cannula Oxygen Delivery: 5 LPM  Weight: 140 lbs 0 oz  General: Alert and oriented x 3. Not in obvious distress.  HEENT: NC, AT. Neck- supple, No JVP elevation, lymphadenopathy or thyromegaly. Trachea-central.  Chest: Clear to auscultation bilaterally.  Heart: S1S2 regular. No M/R/G.  Abdomen: Soft. NT, ND. No organomegaly. Bowel sounds- active.  Back: No spine tenderness. No CVA tenderness.  Extremities: No leg swelling. Peripheral pulses 2+ bilaterally.  Neuro: Cranial nerves 1-12 grossly normal. No focal neurological deficit     Primary Care Physician   Physician No Ref-Primary    Discharge Orders      XR Chest 2 Views     Adult Pulmonary Medicine  Referral      Reason for your hospital stay    Pneumothorax, pleuritic chest pain     Follow-up and recommended labs and tests     Follow up with primary care provider, Physician No Ref-Primary, within 7 days for hospital follow- up.  The following labs/tests are recommended: Chest x-ray.    Follow-up with Dr. Head, at pulmonology clinic, next week.     Activity    Your activity upon discharge: Avoid strenuous activities till seen at pulmonology clinic.     Diet    Follow this diet upon discharge: Orders Placed This Encounter      Regular Diet Adult     Significant Results and Procedures   Results for orders placed or performed during the hospital encounter of 08/09/24   XR Chest Port 1 View    Narrative    EXAM: XR CHEST PORT 1 VIEW  LOCATION: [facility]  DATE: 8/9/2024    INDICATION: chest pain, follow-up pneumothorax  COMPARISON: 8/9/2024 at 1806 hours      Impression    IMPRESSION: No change. Small left pneumothorax measures approximately 1.8 cm at the apex. Lungs remain clear. No shift of midline. Heart size normal.   Chest CT w/o contrast    Narrative    EXAM: CT CHEST W/O CONTRAST  LOCATION: [facility]  DATE:  8/9/2024    INDICATION: spontaneous pneumothorax, ?underlying lung disease  COMPARISON: Chest x-ray 9 2024  TECHNIQUE: CT chest without IV contrast. Multiplanar reformats were obtained. Dose reduction techniques were used.  CONTRAST: None.    FINDINGS:   LUNGS AND PLEURA: Small left pneumothorax. Lungs are clear. No pleural effusion. 3 mm right upper lobe nodule S4 image 54. Several sub-5 mm fissural nodules posteriorly series 4 image 125 - 128 right major fissure.    MEDIASTINUM/AXILLAE: No adenopathy or significant pericardial effusion.    CORONARY ARTERY CALCIFICATION: No significant visualized.    UPPER ABDOMEN: Grossly within normal limits where seen.    MUSCULOSKELETAL: Unremarkable.      Impression    IMPRESSION:   1.  Small left pneumothorax.  2.  Sub-5 mm right nodules.    REFERENCE:  Guidelines for Management of Incidental Pulmonary Nodules Detected on CT Images: From the Fleischner Society 2017.   Guidelines apply to incidental nodules in patients who are 35 years or older.  Guidelines do not apply to lung cancer screening, patients with immunosuppression, or patients with known primary cancer.    REFERENCE:  Guidelines for Management of Incidental Pulmonary Nodules Detected on CT Images: From the Fleischner Society 2017.   Guidelines apply to incidental nodules in patients who are 35 years or older.  Guidelines do not apply to lung cancer screening, patients with immunosuppression, or patients with known primary cancer.    MULTIPLE NODULES  Nodule size <6 mm  Low-risk patients: No follow-up needed.  High-risk patients: Optional follow-up at 12 months.       XR Chest Port 1 View    Narrative    EXAM: XR CHEST PORT 1 VIEW  LOCATION: [facility]  DATE: 8/10/2024    INDICATION: pneumothorax  COMPARISON: CT x-rays from yesterday evening.      Impression    IMPRESSION: Stable pneumothorax is smaller this morning, measuring about 12 mm at the apex compared with 18 to 19 mm yesterday. No  lateral or basilar component is evident. Otherwise negative chest x-ray.     Discharge Medications   Current Discharge Medication List        START taking these medications    Details   acetaminophen (TYLENOL) 325 MG tablet Take 2 tablets (650 mg) by mouth every 4 hours as needed for mild pain or other (and adjunct with moderate or severe pain or per patient request)    Associated Diagnoses: Spontaneous pneumothorax           Allergies   Allergies   Allergen Reactions    Gluten Meal    This document is created with the help of Dragon dictation system. All grammatical errors are unintentional.

## 2024-08-10 NOTE — MEDICATION SCRIBE - ADMISSION MEDICATION HISTORY
Medication Scribe Admission Medication History    Admission medication history is complete. The information provided in this note is only as accurate as the sources available at the time of the update.    Information Source(s): Patient via in-person    Pertinent Information: Patient reports taking no medications     Changes made to PTA medication list:  Added: None  Deleted: Multivitamin   Changed: None    Allergies reviewed with patient and updates made in EHR: yes    Medication History Completed By: Andrew Lemus 8/9/2024 9:21 PM    No outpatient medications have been marked as taking for the 8/9/24 encounter (Hospital Encounter).

## 2024-08-10 NOTE — PLAN OF CARE
Problem: Adult Inpatient Plan of Care  Goal: Plan of Care Review  Description: The Plan of Care Review/Shift note should be completed every shift.  The Outcome Evaluation is a brief statement about your assessment that the patient is improving, declining, or no change.  This information will be displayed automatically on your shift  note.  Outcome: Progressing     Problem: Adult Inpatient Plan of Care  Goal: Optimal Comfort and Wellbeing  Outcome: Progressing     Problem: Pulmonary Impairment  Goal: Improved Activity Tolerance  Outcome: Progressing     Problem: Gas Exchange Impaired  Goal: Optimal Gas Exchange  Outcome: Progressing  Intervention: Optimize Oxygenation and Ventilation  Recent Flowsheet Documentation  Taken 8/10/2024 0001 by Rachel Thomas, RN  Head of Bed (HOB) Positioning: HOB at 20 degrees     Problem: Pulmonary Impairment  Goal: Effective Airway Clearance  Outcome: Progressing     Problem: Pulmonary Impairment  Goal: Optimal Gas Exchange  Outcome: Progressing   Goal Outcome Evaluation:  Patient is alert and oriented x4 and makes his needs known. Denied shortness of breath but stated slight chest pain with deep breathing. Remained on 4L O2 via nasal canula. Slept well between cares.

## 2024-08-10 NOTE — ED NOTES
"[facility] ED Handoff Report    ED Chief Complaint: chest pain/back pain, increased with deep breathing    ED Diagnosis:  (J93.83) Spontaneous pneumothorax  Comment: No chest tube at this time. CXR at 0600 tomorrow  Plan: admit       PMH:  History reviewed. No pertinent past medical history.     Code Status:  Full Code     Falls Risk: No Band: Not applicable    Current Living Situation/Residence: with parents     Elimination Status: Continent: Yes     Activity Level: Independent    Patients Preferred Language:  English     Needed: No    Vital Signs:  /72   Pulse 81   Temp 99  F (37.2  C) (Temporal)   Resp 16   Ht 1.651 m (5' 5\")   Wt 63.5 kg (140 lb)   SpO2 100%   BMI 23.30 kg/m       Cardiac Rhythm: SB    Pain Score: 1/10    Is the Patient Confused:  No    Last Food or Drink: 08/09/24 at drinking water and snacks at this time. Patient is gluten free    Focused Assessment:  Very slightly decreased breath sounds on left. Clear bilaterally. Increased pain with deep breathing is almost gone now per patient.     Tests Performed: Done: Labs and Imaging    Treatments Provided:  none    Family Dynamics/Concerns: No    Family Updated On Visitor Policy: Yes    Plan of Care Communicated to Family: Yes    Who Was Updated about Plan of Care: Parents at bedside    Belongings Sent with Patient: clothing, keys, hat sunglasses, wallet, phone    Medications sent with patient: none    Additional Information: call with questions    RN: Carri Layne RN   8/9/2024 10:36 PM       "

## 2024-08-12 ENCOUNTER — PATIENT OUTREACH (OUTPATIENT)
Dept: ONCOLOGY | Facility: CLINIC | Age: 19
End: 2024-08-12

## 2024-08-12 ENCOUNTER — ANCILLARY PROCEDURE (OUTPATIENT)
Dept: GENERAL RADIOLOGY | Facility: CLINIC | Age: 19
End: 2024-08-12
Attending: INTERNAL MEDICINE
Payer: COMMERCIAL

## 2024-08-12 ENCOUNTER — NURSE TRIAGE (OUTPATIENT)
Dept: ONCOLOGY | Facility: CLINIC | Age: 19
End: 2024-08-12

## 2024-08-12 DIAGNOSIS — J93.11 PRIMARY SPONTANEOUS PNEUMOTHORAX: Primary | ICD-10-CM

## 2024-08-12 DIAGNOSIS — J93.11 PRIMARY SPONTANEOUS PNEUMOTHORAX: ICD-10-CM

## 2024-08-12 LAB
ATRIAL RATE - MUSE: 85 BPM
DIASTOLIC BLOOD PRESSURE - MUSE: 99 MMHG
INTERPRETATION ECG - MUSE: NORMAL
P AXIS - MUSE: 63 DEGREES
PR INTERVAL - MUSE: 156 MS
QRS DURATION - MUSE: 76 MS
QT - MUSE: 352 MS
QTC - MUSE: 418 MS
R AXIS - MUSE: 87 DEGREES
RADIOLOGIST FLAGS: ABNORMAL
SYSTOLIC BLOOD PRESSURE - MUSE: 123 MMHG
T AXIS - MUSE: 59 DEGREES
VENTRICULAR RATE- MUSE: 85 BPM

## 2024-08-12 PROCEDURE — 71046 X-RAY EXAM CHEST 2 VIEWS: CPT | Mod: TC | Performed by: RADIOLOGY

## 2024-08-12 NOTE — PROGRESS NOTES
Brief Pulm Note    CXR with ptx at 17 vs. 15 mm on Saturday. Looks similar to Friday night. Overall likely unchanged.    Still having some symptoms but feeling much better than admission.    This is his first confirmed pneumothorax, I believe, but he has had 2 previous similar episodes.    He wants to be proactive about not having this occur again in the future.    I will place thoracic surgery referral and discuss with them.    Repeat CXR later this week.    Brandon Head MD

## 2024-08-12 NOTE — TELEPHONE ENCOUNTER
Danielle from [facility] reporting Urgent findings:    IMPRESSION: Small left apex pneumothorax is currently 17 mm,  previously 15 mm when remeasuring the prior x-ray at a similar level.  Recommend follow-up chest x-ray in 1-2 days for continued monitoring.  No new airspace disease. Normal cardiac silhouette.     1526 Dr. Head and Neeta RNCC aware Dr. Head already addressed findings.          English

## 2024-08-12 NOTE — Clinical Note
Future Appointments 8/12/2024  11:20 AM   CLXR1                      CLXRA               FLCL 10/3/2024  7:30 AM    MPBE PFT RM 1              MBPULM              Beam 10/3/2024  9:00 AM    Ana Rosa Herrera MD          [facility]              Beam

## 2024-08-12 NOTE — PROGRESS NOTES
Dr. Head will let us know when pulm visit is needed.  He will see patient.  Patient seeing Thoracic surgery first.

## 2024-08-12 NOTE — PROGRESS NOTES
New Patient Oncology Nurse Navigator Note     Referring provider: Dr. Brandon Head    Referring Clinic/Organization: [facility]  Referred to: Thoracic Surgery  Requested provider (if applicable): First available - did not specify   Referral Received: 08/12/24       Evaluation for :   Diagnosis   J93.11 (ICD-10-CM) - Primary spontaneous pneumothorax     Clinical History (per Nurse review of records provided):      08/09/2024 CT Chest w/o contrast (bookmarked) showed:   IMPRESSION:   1.  Small left pneumothorax.  2.  Sub-5 mm right nodules.    08/10/2024 XR Chest (bookmarked) showed:   IMPRESSION: Stable pneumothorax is smaller this morning, measuring about 12 mm at the apex compared with 18 to 19 mm yesterday. No lateral or basilar component is evident. Otherwise negative chest x-ray.       Clinical Assessment / Barriers to Care (Per Nurse):  Tobacco History    Smoking Status  Never     Records Location: [facility]   Records Needed: None  Additional testing needed prior to consult: None  Referral updates and Plan:     8/12: Per IB from Dr. Head, he is requesting pt be scheduled for follow up CXR on Thursday, 8/15 followed by Thoracic Surgery consult.     8/13: Writer called Jason to discuss the referral. He confirmed he would be able to make an in person appt with thoracic surgery for consult. All questions were answered. His call was transferred to NPS to schedule.     CARLEEN GuerinN, RN, OCN  [facility] Oncology Nurse Navigator  [phone] / [phone]

## 2024-08-13 NOTE — PROGRESS NOTES
"THORACIC SURGERY - NEW PATIENT OFFICE VISIT      Dear Dr. Head,    I saw Jason Vaughn in consultation for the evaluation and treatment of a Left PSP.     HPI  Jason Vaughn is a 19 year old male who presented with sudden onset left chest pain and dyspnea. He was found to have a left PSP. This was the first confirmed PTX however, he has had 2 similar episodes and is interested in looking into surgery.     Previsit Tests   CXR: Small left apical PTX.         PMH    No past medical history on file.     PSH    No past surgical history on file.        Allergies   Allergen Reactions    Gluten Meal      Current Outpatient Medications   Medication Sig Dispense Refill    acetaminophen (TYLENOL) 325 MG tablet Take 2 tablets (650 mg) by mouth every 4 hours as needed for mild pain or other (and adjunct with moderate or severe pain or per patient request)       No current facility-administered medications for this visit.     Social History     Tobacco Use    Smoking status: Never   Substance Use Topics    Alcohol use: No    Drug use: No       Physical examination  /75 (BP Location: Right arm, Patient Position: Sitting, Cuff Size: Adult Regular)   Pulse 65   Temp 98.2  F (36.8  C) (Oral)   Resp 16   Ht 1.657 m (5' 5.25\")   Wt 64.5 kg (142 lb 3.2 oz)   SpO2 99%   BMI 23.48 kg/m    Alert and oriented NAD  Bilateral breath sounds.     From a personal perspective, he comes to clinic with his mom. 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       IMPRESSION   19 year old male with left PSP, first confirmed episode.     PLAN  I spent 30 min on the date of the encounter in chart review, patient visit, review of tests, documentation and/or discussion with other providers about the issues documented above. I reviewed the plan as follows:    I had a lengthy discussion with Jason about his diagnosis and options moving forward. I explained to him what the natural history of his disease is, and the " rationale for surgery. We discussed high risk lifestyle activities, signs and symptoms of PTX recurrence. We talked about pros and cons of surgery. They would like to think about it and let us know what they decide.     I appreciate the opportunity to participate in the care of your patient and will keep you updated.    Sincerely,    Hemant Dos Santos MD

## 2024-08-13 NOTE — TELEPHONE ENCOUNTER
RECORDS STATUS - ALL OTHER DIAGNOSIS      RECORDS RECEIVED FROM: Camila Dunbar   DATE RECEIVED: 8/14   NOTES STATUS DETAILS   OFFICE NOTE from referring provider Epic Via recent ED admission 8/9/24   DISCHARGE SUMMARY from hospital Epic 8/9/24   DISCHARGE REPORT from the ER CE - Camila 8/9/24 (Urgent Care)   MEDICATION LIST Epic/MONICA    LABS     ANYTHING RELATED TO DIAGNOSIS Epic 8/10/24   IMAGING (NEED IMAGES & REPORT)     XRAYS PACS 8/9/24: Camila

## 2024-08-14 ENCOUNTER — DOCUMENTATION ONLY (OUTPATIENT)
Dept: SURGERY | Facility: CLINIC | Age: 19
End: 2024-08-14
Payer: COMMERCIAL

## 2024-08-14 ENCOUNTER — PRE VISIT (OUTPATIENT)
Dept: SURGERY | Facility: CLINIC | Age: 19
End: 2024-08-14

## 2024-08-14 ENCOUNTER — ONCOLOGY VISIT (OUTPATIENT)
Dept: SURGERY | Facility: CLINIC | Age: 19
End: 2024-08-14
Attending: INTERNAL MEDICINE
Payer: COMMERCIAL

## 2024-08-14 VITALS
TEMPERATURE: 98.2 F | SYSTOLIC BLOOD PRESSURE: 121 MMHG | RESPIRATION RATE: 16 BRPM | WEIGHT: 142.2 LBS | BODY MASS INDEX: 23.69 KG/M2 | OXYGEN SATURATION: 99 % | HEART RATE: 65 BPM | HEIGHT: 65 IN | DIASTOLIC BLOOD PRESSURE: 75 MMHG

## 2024-08-14 DIAGNOSIS — J93.11 PRIMARY SPONTANEOUS PNEUMOTHORAX: ICD-10-CM

## 2024-08-14 PROCEDURE — 99213 OFFICE O/P EST LOW 20 MIN: CPT | Performed by: THORACIC SURGERY (CARDIOTHORACIC VASCULAR SURGERY)

## 2024-08-14 PROCEDURE — 99203 OFFICE O/P NEW LOW 30 MIN: CPT | Performed by: THORACIC SURGERY (CARDIOTHORACIC VASCULAR SURGERY)

## 2024-08-14 ASSESSMENT — PAIN SCALES - GENERAL: PAINLEVEL: NO PAIN (0)

## 2024-08-14 NOTE — LETTER
"8/14/2024      Jason Vaughn  [address]  [place] [address]      Dear Colleague,    Thank you for referring your patient, Jason Vaughn, to the [facility]. Please see a copy of my visit note below.    THORACIC SURGERY - NEW PATIENT OFFICE VISIT      Dear Dr. Head,    I saw Jason Vaughn in consultation for the evaluation and treatment of a Left PSP.     HPI  Jason Vaughn is a 19 year old male who presented with sudden onset left chest pain and dyspnea. He was found to have a left PSP. This was the first confirmed PTX however, he has had 2 similar episodes and is interested in looking into surgery.     Previsit Tests   CXR: Small left apical PTX.         PMH    No past medical history on file.     PSH    No past surgical history on file.        Allergies   Allergen Reactions     Gluten Meal      Current Outpatient Medications   Medication Sig Dispense Refill     acetaminophen (TYLENOL) 325 MG tablet Take 2 tablets (650 mg) by mouth every 4 hours as needed for mild pain or other (and adjunct with moderate or severe pain or per patient request)       No current facility-administered medications for this visit.     Social History     Tobacco Use     Smoking status: Never   Substance Use Topics     Alcohol use: No     Drug use: No       Physical examination  /75 (BP Location: Right arm, Patient Position: Sitting, Cuff Size: Adult Regular)   Pulse 65   Temp 98.2  F (36.8  C) (Oral)   Resp 16   Ht 1.657 m (5' 5.25\")   Wt 64.5 kg (142 lb 3.2 oz)   SpO2 99%   BMI 23.48 kg/m    Alert and oriented NAD  Bilateral breath sounds.     From a personal perspective, he comes to clinic with his mom. 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       IMPRESSION   19 year old male with left PSP, first confirmed episode.     PLAN  I spent 30 min on the date of the encounter in chart review, patient visit, review of tests, documentation and/or discussion " with other providers about the issues documented above. I reviewed the plan as follows:    I had a lengthy discussion with Jason about his diagnosis and options moving forward. I explained to him what the natural history of his disease is, and the rationale for surgery. We discussed high risk lifestyle activities, signs and symptoms of PTX recurrence. We talked about pros and cons of surgery. They would like to think about it and let us know what they decide.     I appreciate the opportunity to participate in the care of your patient and will keep you updated.    Sincerely,    Hemant Dos Santos MD        Again, thank you for allowing me to participate in the care of your patient.        Sincerely,        Pablo Friend MD

## 2024-08-14 NOTE — PROGRESS NOTES
Consent to Share Protected Health Information form completed by patient. Authorization to share information with mother, Anuradha Vaughn, and father, Marcel Vaughn.  Form sent to scanning to upload into medical record.    Colette Peña RN, BSN  Thoracic Surgery RN Care Coordinator

## 2024-08-14 NOTE — NURSING NOTE
"Oncology Rooming Note    August 14, 2024 9:18 AM   Jason Vaughn is a 19 year old male who presents for:    Chief Complaint   Patient presents with    Oncology Clinic Visit     Primary spontaneous pneumothorax     Initial Vitals: /75 (BP Location: Right arm, Patient Position: Sitting, Cuff Size: Adult Regular)   Pulse 65   Temp 98.2  F (36.8  C) (Oral)   Resp 16   Ht 1.657 m (5' 5.25\")   Wt 64.5 kg (142 lb 3.2 oz)   SpO2 99%   BMI 23.48 kg/m   Estimated body mass index is 23.48 kg/m  as calculated from the following:    Height as of this encounter: 1.657 m (5' 5.25\").    Weight as of this encounter: 64.5 kg (142 lb 3.2 oz). Body surface area is 1.72 meters squared.  No Pain (0) Comment: Data Unavailable   No LMP for male patient.  Allergies reviewed: No  Medications reviewed: No    Medications: Medication refills not needed today.  Pharmacy name entered into [facility]:    [facility] - [place], [place] - [address] [facility] [place], [place] - [address]    Frailty Screening:   Is the patient here for a new oncology consult visit in cancer care? 1. Yes. Over the past month, have you experienced difficulty or required a caregiver to assist with:   1. Balance, walking or general mobility (including any falls)? NO  2. Completion of self-care tasks such as bathing, dressing, toileting, grooming/hygiene?  NO  3. Concentration or memory that affects your daily life?  NO       Clinical concerns: Discomfort in chest. Aches near shoulder blade or chest depending on position.      Tea Odell, EMT  8/14/2024              "

## 2024-08-15 ENCOUNTER — ANCILLARY PROCEDURE (OUTPATIENT)
Dept: GENERAL RADIOLOGY | Facility: CLINIC | Age: 19
End: 2024-08-15
Attending: INTERNAL MEDICINE
Payer: COMMERCIAL

## 2024-08-15 DIAGNOSIS — J93.83 SPONTANEOUS PNEUMOTHORAX: ICD-10-CM

## 2024-08-15 DIAGNOSIS — J93.11 PRIMARY SPONTANEOUS PNEUMOTHORAX: ICD-10-CM

## 2024-08-15 PROCEDURE — 71046 X-RAY EXAM CHEST 2 VIEWS: CPT | Mod: TC | Performed by: RADIOLOGY

## 2024-08-15 NOTE — PROGRESS NOTES
Dr. Head will let us know when he needs to follow up with him in clinic.  Already scheduled with thoracic surgey  DH

## 2024-09-11 ENCOUNTER — ANCILLARY PROCEDURE (OUTPATIENT)
Dept: GENERAL RADIOLOGY | Facility: CLINIC | Age: 19
End: 2024-09-11
Attending: PHYSICIAN ASSISTANT
Payer: COMMERCIAL

## 2024-09-11 ENCOUNTER — OFFICE VISIT (OUTPATIENT)
Dept: FAMILY MEDICINE | Facility: CLINIC | Age: 19
End: 2024-09-11
Payer: COMMERCIAL

## 2024-09-11 VITALS
DIASTOLIC BLOOD PRESSURE: 70 MMHG | SYSTOLIC BLOOD PRESSURE: 110 MMHG | WEIGHT: 145.2 LBS | RESPIRATION RATE: 20 BRPM | HEART RATE: 97 BPM | OXYGEN SATURATION: 99 % | TEMPERATURE: 98 F | HEIGHT: 65 IN | BODY MASS INDEX: 24.19 KG/M2

## 2024-09-11 DIAGNOSIS — J93.83 SPONTANEOUS PNEUMOTHORAX: Primary | ICD-10-CM

## 2024-09-11 DIAGNOSIS — J93.83 SPONTANEOUS PNEUMOTHORAX: ICD-10-CM

## 2024-09-11 PROCEDURE — 71046 X-RAY EXAM CHEST 2 VIEWS: CPT | Mod: TC | Performed by: RADIOLOGY

## 2024-09-11 PROCEDURE — 99214 OFFICE O/P EST MOD 30 MIN: CPT | Performed by: PHYSICIAN ASSISTANT

## 2024-09-11 ASSESSMENT — PATIENT HEALTH QUESTIONNAIRE - PHQ9
SUM OF ALL RESPONSES TO PHQ QUESTIONS 1-9: 16
10. IF YOU CHECKED OFF ANY PROBLEMS, HOW DIFFICULT HAVE THESE PROBLEMS MADE IT FOR YOU TO DO YOUR WORK, TAKE CARE OF THINGS AT HOME, OR GET ALONG WITH OTHER PEOPLE: VERY DIFFICULT
SUM OF ALL RESPONSES TO PHQ QUESTIONS 1-9: 16

## 2024-09-11 ASSESSMENT — PAIN SCALES - GENERAL: PAINLEVEL: NO PAIN (0)

## 2024-09-11 NOTE — PROGRESS NOTES
"  Assessment & Plan     Spontaneous pneumothorax  Symptoms nearly resolved. No breathing issues, but still with some mild intermittent chest and back tightness. XR today shows resolution of his PNX. Pt can RTW without restrictions. Paperwork updated. Warning signs and symptoms discussed on when to return to clinic or go to the ER. Pt verbalized understanding.    - XR Chest 2 Views; Future    Follow Up Actions Taken  Referred patient back to PCP     Subjective   Jason is a 19 year old, presenting for the following health issues:  Forms        9/11/2024    11:20 AM   Additional Questions   Roomed by Gunjan ARANGO LPN   Accompanied by Self     History of Present Illness       Reason for visit:  Pneumothorax    He eats 0-1 servings of fruits and vegetables daily.He consumes 0 sweetened beverage(s) daily.He exercises with enough effort to increase his heart rate 60 or more minutes per day.  He exercises with enough effort to increase his heart rate 6 days per week.      Would like to have an x-ray to see If everything is healing. Forms to be completed. Does still get pains at times. Shoulder blades can feel tingly and numb        Review of Systems  See HPI       Objective    /70   Pulse 97   Temp 98  F (36.7  C) (Tympanic)   Resp 20   Ht 1.657 m (5' 5.25\")   Wt 65.9 kg (145 lb 3.2 oz)   SpO2 99%   BMI 23.98 kg/m    Body mass index is 23.98 kg/m .  Physical Exam   Constitutional: healthy, alert, and no distress  Head: Normocephalic. Atraumatic  Eyes: No conjunctival injection, sclera anicteric  Neck: supple, no thyromegaly, nodules or asymmetry of the thyroid. No cervical LAD.  Cardiovascular: RRR. No murmurs, clicks, gallops, or rubs. No peripheral edema.   Respiratory: No resp distress. Lungs CTAB bilaterally.   Musculoskeletal: extremities normal- no gross deformities noted, and normal muscle tone  Skin: no suspicious lesions or rashes  Neurologic: Gait normal. CN 2-12 grossly intact  Psychiatric: mentation " appears normal and affect normal/bright     Chest XR: Per my read, PNX has resolved.         Signed Electronically by: Joseph Jovel PA-C

## 2024-09-11 NOTE — LETTER
September 11, 2024      Jason Vaughn  [address]  [place] [address]        To Whom It May Concern:    Jason Vaughn was seen in our clinic. He may return to work without restrictions on 9/16/24.      Sincerely,        Joseph Jovel PA-C

## 2024-10-03 ENCOUNTER — OFFICE VISIT (OUTPATIENT)
Dept: PULMONOLOGY | Facility: CLINIC | Age: 19
End: 2024-10-03
Attending: INTERNAL MEDICINE
Payer: COMMERCIAL

## 2024-10-03 VITALS
HEIGHT: 65 IN | HEART RATE: 92 BPM | SYSTOLIC BLOOD PRESSURE: 138 MMHG | WEIGHT: 147 LBS | BODY MASS INDEX: 24.49 KG/M2 | DIASTOLIC BLOOD PRESSURE: 86 MMHG | OXYGEN SATURATION: 95 %

## 2024-10-03 DIAGNOSIS — J93.83 SPONTANEOUS PNEUMOTHORAX: ICD-10-CM

## 2024-10-03 PROCEDURE — 99213 OFFICE O/P EST LOW 20 MIN: CPT | Performed by: INTERNAL MEDICINE

## 2024-10-03 NOTE — PROGRESS NOTES
CCx:Follow-up after hospitalization for a spontaneous pneumothorax    HPI:Mr. Vaughn is a 19 year old gentleman with no past medical history who was briefly hospitalized in August with a spontaneous pneumothorax that was managed conservatively in the hospital and was thought to COVID due to a combination of his weight lifting and marijuana use.  Since the time of discharge, he states that he has no shortness of breath has very occasional left scapular pain that is unrelated to activity and denies coughing, hemoptysis, fevers or chills.  He did meet with Dr. Shah from CV surgery to discuss potential definitive procedure for management of pneumothorax, however, at the time did not opt to undergo a pleurodesis.    ROS:  Pertinent positives alluded to in the HPI. Remainder of 10 point ROS is negative.     PMH:  Spontaneous pneumothorax    Allergies:  Reviewed.     Family HX:  Reviewed.    Social Hx:  Marital status: No  Number of children: No  Resides in a house, no concern for mold.  Occupational history:    service: No  Pets: 2 dogs, 1 cat  Smoking history: 0 pack year history  Alcohol use: Few drinks a week   Recreational drug use: Smokes marijuana daily  Hobbies: Weight lifting, works on his car  Recent Travel: No    Current Meds:  Current Outpatient Medications   Medication Sig Dispense Refill    acetaminophen (TYLENOL) 325 MG tablet Take 2 tablets (650 mg) by mouth every 4 hours as needed for mild pain or other (and adjunct with moderate or severe pain or per patient request)       Labs:  Reviewed.     Imaging studies:  CXR 9/11/24:  Negative chest. No residual or recurrent pneumothorax.     CT Chest w/o Contrast 8/9/24:  1.  Small left pneumothorax.  2.  Sub-5 mm right nodules.    CXR 8/15/24:  Cardiac silhouette is within normal limits. No infiltrate or effusion. Hyperlucency at the left apex indicates persistent pneumothorax, but the pleural line is not well visualized. Best estimate  "for the [id number] distance is 11 mm which is slightly  decreased since the previous exam. No significant bony abnormalities.    CXR 8/12/24:  Small left apex pneumothorax is currently 17 mm, previously 15 mm when remeasuring the prior x-ray at a similar level. Recommend follow-up chest x-ray in 1-2 days for continued monitoring. No new airspace disease. Normal cardiac silhouette.    Physical Exam:  /86   Pulse 92   Ht 1.657 m (5' 5.25\")   Wt 66.7 kg (147 lb)   SpO2 95%   BMI 24.27 kg/m    GEN: NAD  HEENT: PERRL, No JVD  LUNGS: CTA BL  CVS: S1 & S2 no added sounds  ABD: No HSM, BS audible  EXT: No edema, no rashes  NEURO: AO*3 CN2-12 intact    Assessment and Plan:Jason Vaughn is a 19 year old with a past medical history significant for spontaneous pneumothorax who presents to clinic today for follow-up visit following his hospitalization.  For the present we would recommend;    Spontaneous pneumothorax: As noted above this was managed conservatively and has not recurred.  Likely etiology of this was a combination of his weight lifting and daily marijuana use which he has admittedly cut down on.  He did meet with Dr. Shah from thoracic surgery for his request to meet with them and after hearing the pros and cons opted for a more conservative approach to management.  Follow-up chest imaging performed after discharge did not demonstrate recurrence of his pneumothorax.  There is no need to follow-up with us on a scheduled basis but he does know how to contact us in the thoracic surgery clinic should he need guidance or have recurrence of his pneumothorax.  Follow-up: As needed basis.      Ana Rosa Herrera MD  Pulmonary and Critical Care  [facility]        "

## 2025-03-29 ENCOUNTER — HEALTH MAINTENANCE LETTER (OUTPATIENT)
Age: 20
End: 2025-03-29